# Patient Record
(demographics unavailable — no encounter records)

---

## 2024-11-09 NOTE — CARDIOLOGY SUMMARY
[de-identified] : ECG from 11/14/2024: ECG from 8/29/2023: AF with ventricular rate 111bpm [de-identified] : TTE from 9/23/2024: EF: 63%, mild LA dilation (AASHISH: 25), mild RA dilation, mild MR, aortic valve sclerosis, normal RV size and probably normal RVSF, mild TR, trace PI, no pericardial effusion [de-identified] : [facility] from 12/15/2015 (Dr. Ollie Lyon): normal coronary arteries

## 2024-11-09 NOTE — HISTORY OF PRESENT ILLNESS
[FreeTextEntry1] : Mr. Dhiraj Villa is a 54-year-old man with pre-diabetes and likely long-standing persistent atrial fibrillation. He presents today for an initial evaluation.  To briefly summarize his history, in 2015 he presented to [facility] with palpitations. A coronary angiogram was performed. There was no evidence of obstructive coronary artery disease. In August of 2023 he presented to the Emergency Department at [facility] with fever and body aches. His electrocardiogram demonstrated atrial fibrillation with a ventricular rate of 111 beats per minute. He established care with Dr. Paul Parkinson on September 9th, 2024. His electrocardiogram again demonstrated atrial fibrillation. Eliquis 5mg twice daily was initiated.  He reports symptoms of ____   CHADS2-VASC: 0

## 2024-12-14 NOTE — HISTORY OF PRESENT ILLNESS
[FreeTextEntry1] : Mr. Dhiraj Villa is a 54-year-old man with pre-diabetes and likely long-standing persistent atrial fibrillation. He presents today for an initial evaluation.  To briefly summarize his history, in 2015 he presented to [facility] with palpitations. A coronary angiogram was performed. There was no evidence of obstructive coronary artery disease. In August of 2023 he presented to the Emergency Department at [facility] with fever and body aches. His electrocardiogram demonstrated atrial fibrillation with a ventricular rate of 111 beats per minute. He established care with Dr. Paul Parkinson on September 9th, 2024. His electrocardiogram again demonstrated atrial fibrillation. Eliquis 5mg twice daily was initiated. He discontinued Eliquis in September of 2024 due to reported shortness of breath while taking this medication. Xarelto was initiated on December 11th, 2024.  He reports symptoms of ____  He is scheduled for a sleep study in January of 2025.  CHADS2-VASC: 0

## 2024-12-14 NOTE — CARDIOLOGY SUMMARY
[de-identified] : ECG from 12/20/2024: ECG from 12/11/2024: AF with a ventricular rate of 79bpm ECG from 8/29/2023: AF with ventricular rate 111bpm [de-identified] : TTE from 9/23/2024: EF: 63%, mild LA dilation (AASHISH: 25), mild RA dilation, mild MR, aortic valve sclerosis, normal RV size and probably normal RVSF, mild TR, trace PI, no pericardial effusion [de-identified] : [facility] from 12/15/2015 (Dr. Ollie Lyon): normal coronary arteries

## 2024-12-16 NOTE — CARDIOLOGY SUMMARY
[de-identified] : 8/20/2024 - atrial fibrillation at 80 bpm [de-identified] : 11/28/2023 - septum 0.9 cm, PWT 0.7 cm, mildly dilated LA, mildly dilated RA, hyperdynamic LV systolic function, LVEF 65% [de-identified] : 12/15/2016 with Ollie Ya MD at [facility] - normal coronary arteries

## 2024-12-16 NOTE — HISTORY OF PRESENT ILLNESS
[FreeTextEntry1] : Patient is a 54 year-old Black gentleman with known past medical history of anxiety and atrial fibrillation who presents today to [facility] care.  In December 2015, he presented to [facility] with palpitations that were occurring at night. Cardiac evaluation was negative. He was started on clonazepam for anxiety and panic attacks. Years later, he wanted to wean off benzodiazepine. 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  He is now seen to be in atrial fibrillation that was first noted by his primary care physician in 2023. He was referred to a cardiologist at [facility] for further evaluation. He had an echocardiogram that showed normal LV systolic function (described as hyperdynamic with LVEF 65%), with mildly enlarged atria.

## 2024-12-16 NOTE — HISTORY OF PRESENT ILLNESS
[FreeTextEntry1] : Patient is a 54 year-old Black gentleman with known past medical history of anxiety and atrial fibrillation who presents today to hospitals care.  In December 2015, he presented to [facility] with palpitations that were occurring at night. Cardiac evaluation was negative. He was started on clonazepam for anxiety and panic attacks. Years later, he wanted to wean off benzodiazepine. 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  He is now seen to be in atrial fibrillation that was first noted by his primary care physician in 2023. He was referred to a cardiologist at [facility] for further evaluation. He had an echocardiogram that showed normal LV systolic function (described as hyperdynamic with LVEF 65%), with mildly enlarged atria.

## 2024-12-16 NOTE — DISCUSSION/SUMMARY
[EKG obtained to assist in diagnosis and management of assessed problem(s)] : EKG obtained to assist in diagnosis and management of assessed problem(s) [FreeTextEntry1] : Patient is a 54-year-old Black gentleman with history as above including atrial fibrillation, who presents today for scheduled follow up.   Will refer to EP for evaluation and restoration of sinus rhythm. Will try Xarelto 20 mg daily as he did not feel well on Eliquis previously.  I have stressed the importance of medication consistency toward the goal of stroke risk reduction.  Recommend evaluation of obstructive sleep apnea as possible etiology of his atrial fibrillation. He is now pending sleep study in mid-January.

## 2024-12-16 NOTE — CARDIOLOGY SUMMARY
[de-identified] : 8/20/2024 - atrial fibrillation at 80 bpm [de-identified] : 11/28/2023 - septum 0.9 cm, PWT 0.7 cm, mildly dilated LA, mildly dilated RA, hyperdynamic LV systolic function, LVEF 65% [de-identified] : 12/15/2016 with Ollie Ya MD at [facility] - normal coronary arteries

## 2024-12-16 NOTE — REASON FOR VISIT
[Other: ____] : [unfilled] [FreeTextEntry1] : 12/11/2024 Dhiraj Villa returns today for scheduled follow up.  He self-discontinued Eliquis in September after only a few weeks as he felt short of breath on the medication. Otherwise he is feeling well and today he has no specific complaints. For exercise he jogs around the park and denies any chest discomfort, shortness of breath, palpitations, lightheadedness or syncope.

## 2025-01-03 NOTE — HISTORY OF PRESENT ILLNESS
[FreeTextEntry1] : Mr. Dhiraj Villa is a 54-year-old man with pre-diabetes and likely long-standing persistent atrial fibrillation. He presents today for an initial evaluation.  To briefly summarize his history, in 2015 he presented to [facility] with palpitations. A coronary angiogram was performed. There was no evidence of obstructive coronary artery disease. In August of 2023 he presented to the Emergency Department at [facility] with fever and body aches. His electrocardiogram demonstrated atrial fibrillation with a ventricular rate of 111 beats per minute. He established care with Dr. Paul Parkinson on September 9th, 2024. His electrocardiogram again demonstrated atrial fibrillation. Eliquis 5mg twice daily was initiated. He discontinued Eliquis in September of 2024 due to reported shortness of breath while taking this medication. Xarelto was initiated on December 11th, 2024. He has been compliant with his Xarelto over the last 2 weeks but prior to that has been off anticoagulation.  He reports that he is unaware of his atrial fibrillation. He states that he has some "shortness of breath" which he attributes to the initiation of his anticoagulant. He experienced this symptom while taking both Eliquis and Xarelto. He denies a decline in his exercise tolerance and denies chest pain, near syncope or syncope. He reported palpitations in the past dating back to 2013 when he was diagnosed with panic attacks. he has over recent years been weaning off his anxiolytics and attributed intermittent palpitations to "withdrawal" of these medications.   He is scheduled for a sleep study in January of 2025.  CHADS2-VASC: 0

## 2025-01-03 NOTE — CARDIOLOGY SUMMARY
[de-identified] : ECG from 1/3/2025: Atrial fibrillation with a ventricular rate of 82bpm ECG from 12/11/2024: AF with a ventricular rate of 79bpm ECG from 8/29/2023: AF with ventricular rate 111bpm [de-identified] : TTE from 9/23/2024: EF: 63%, mild LA dilation (AASHISH: 25), mild RA dilation, mild MR, aortic valve sclerosis, normal RV size and probably normal RVSF, mild TR, trace PI, no pericardial effusion [de-identified] : [facility] from 12/15/2015 (Dr. Ollie Lyon): normal coronary arteries

## 2025-01-03 NOTE — HISTORY OF PRESENT ILLNESS
[FreeTextEntry1] : Mr. Dhiraj Villa is a 54-year-old man with pre-diabetes and likely long-standing persistent atrial fibrillation. He presents today for an initial evaluation.  To briefly summarize his history, in 2015 he presented to [facility] with palpitations. A coronary angiogram was performed. There was no evidence of obstructive coronary artery disease. In August of 2023 he presented to the Emergency Department at [facility] with fever and body aches. His electrocardiogram demonstrated atrial fibrillation with a ventricular rate of 111 beats per minute. He established care with Dr. Paul Parkinson on September 9th, 2024. His electrocardiogram again demonstrated atrial fibrillation. Eliquis 5mg twice daily was initiated. He discontinued Eliquis in September of 2024 due to reported shortness of breath while taking this medication. Xarelto was initiated on December 11th, 2024. He has been compliant with his Xarelto over the last 2 weeks but prior to that has been off anticoagulation.  He reports that he is unaware of his atrial fibrillation. He states that he has some "shortness of breath" which he attributes to the initiation of his anticoagulant. He experienced this symptom while taking both Eliquis and Xarelto. He denies a decline in his exercise tolerance and denies chest pain, near syncope or syncope. He reported palpitations in the past dating back to 2013 when he was diagnosed with panic attacks. he has over recent years been weaning off his anxiolytics and attributed intermittent palpitations to "withdrawal" of these medications.   He is scheduled for a sleep study in January of 2025.  CHADS2-VASC: 0

## 2025-01-03 NOTE — PHYSICAL EXAM
[Well Developed] : well developed [Well Nourished] : well nourished [No Acute Distress] : no acute distress [Normal Conjunctiva] : normal conjunctiva [Normal Venous Pressure] : normal venous pressure [Normal S1, S2] : normal S1, S2 [No Murmur] : no murmur [No Rub] : no rub [No Gallop] : no gallop [Clear Lung Fields] : clear lung fields [Good Air Entry] : good air entry [No Respiratory Distress] : no respiratory distress  [Soft] : abdomen soft [Non Tender] : non-tender [Normal Bowel Sounds] : normal bowel sounds [Normal Gait] : normal gait [No Edema] : no edema [No Cyanosis] : no cyanosis [No Rash] : no rash [Moves all extremities] : moves all extremities [No Focal Deficits] : no focal deficits [Normal Speech] : normal speech [Alert and Oriented] : alert and oriented [Normal memory] : normal memory [de-identified] : irregularly irregular

## 2025-01-03 NOTE — END OF VISIT
[FreeTextEntry3] : Patient seen with SHARIFA Wells. Plan for CT then DCCV for long-standing persistent atrial fibrillation. If he feels better once sinus rhythm is restored, I would recommend a catheter ablation vs. a hybrid ablation.  [Time Spent: ___ minutes] : I have spent [unfilled] minutes of time on the encounter which excludes teaching and separately reported services.

## 2025-01-03 NOTE — DISCUSSION/SUMMARY
[EKG obtained to assist in diagnosis and management of assessed problem(s)] : EKG obtained to assist in diagnosis and management of assessed problem(s) [FreeTextEntry1] : In summary, Mr. Villa is a 54-year-old gentleman with a history of anxiety and longstanding persistent atrial fibrillation. We discussed the pathophysiology of atrial fibrillation including management strategies and thromboembolic prophylaxis. The options of a rate control versus a rhythm control strategy were discussed. Although he reports being asymptomatic, we discussed that he may have accommodated to his symptoms. As an initial first step we discussed the role of a cardioversion to assess if he feels better in sinus rhythm. If he feels better in sinus rhythm, we then discussed long-term atrial fibrillation strategies including catheter ablation and hybrid ablation. After all questions were answered, the patient would like to proceed with a cardioversion. He will require a CT scan to assess for a LA thrombus prior to cardioversion as he has not been on an adequate duration of therapeutic anticoagulation. We will schedule his cardioversion days after he obtains his CT scan.   We also stressed importance of compliance of anticoagulation and to take his Xarelto with the largest meal of the day.

## 2025-01-03 NOTE — CARDIOLOGY SUMMARY
[de-identified] : ECG from 1/3/2025: Atrial fibrillation with a ventricular rate of 82bpm ECG from 12/11/2024: AF with a ventricular rate of 79bpm ECG from 8/29/2023: AF with ventricular rate 111bpm [de-identified] : TTE from 9/23/2024: EF: 63%, mild LA dilation (AASHISH: 25), mild RA dilation, mild MR, aortic valve sclerosis, normal RV size and probably normal RVSF, mild TR, trace PI, no pericardial effusion [de-identified] : [facility] from 12/15/2015 (Dr. Ollie Lyon): normal coronary arteries

## 2025-01-03 NOTE — PHYSICAL EXAM
[Well Developed] : well developed [Well Nourished] : well nourished [No Acute Distress] : no acute distress [Normal Conjunctiva] : normal conjunctiva [Normal Venous Pressure] : normal venous pressure [Normal S1, S2] : normal S1, S2 [No Murmur] : no murmur [No Rub] : no rub [No Gallop] : no gallop [Clear Lung Fields] : clear lung fields [Good Air Entry] : good air entry [No Respiratory Distress] : no respiratory distress  [Soft] : abdomen soft [Non Tender] : non-tender [Normal Bowel Sounds] : normal bowel sounds [Normal Gait] : normal gait [No Edema] : no edema [No Cyanosis] : no cyanosis [No Rash] : no rash [Moves all extremities] : moves all extremities [No Focal Deficits] : no focal deficits [Normal Speech] : normal speech [Alert and Oriented] : alert and oriented [Normal memory] : normal memory [de-identified] : irregularly irregular

## 2025-05-22 NOTE — HISTORY OF PRESENT ILLNESS
[FreeTextEntry1] : Mr. Dhiraj Villa is a 54-year-old man with pre-diabetes and likely long-standing persistent atrial fibrillation (status post direct current cardioversions on February 19th, 2025 and March 5th, 2025, presently on Amiodarone). He presents today for a follow-up visit.  To briefly summarize his history, in 2015 he presented to [facility] with palpitations. A coronary angiogram was performed. There was no evidence of obstructive coronary artery disease. In August of 2023 he presented to the Emergency Department at [facility] with fever and body aches. His electrocardiogram demonstrated atrial fibrillation with a ventricular rate of 111 beats per minute. He established care with Dr. Paul Parkinson on September 9th, 2024. His electrocardiogram again demonstrated atrial fibrillation. Eliquis 5mg twice daily was initiated. He discontinued Eliquis in September of 2024 due to reported shortness of breath while taking this medication. Xarelto was initiated on December 11th, 2024. On February 19th, 2025 he underwent a direct current cardioversion. Amiodarone was initiated following the cardioversion. Due to an early recurrence of atrial fibrillation, he underwent a second cardioversion on March 5th, 2025.  Since his last cardioversion, he has been feeling well overall. He noticed an improved mental clarity approximately one week after the second cardioversion. He states this feeling did not persist. He did not notice any differences in how he felt with respect to fatigue or exercise tolerance. He states his heart no longer feels, "off" though. No reports of chest pain, shortness of breath, dizziness, palpitations, lightheadedness, pre-syncope or syncope. He underwent a sleep study in January of 2025. The results are not available for my review.   CHADS2-VASC: 0

## 2025-05-22 NOTE — PHYSICAL EXAM
[Well Developed] : well developed [Well Nourished] : well nourished [No Acute Distress] : no acute distress [Normal Venous Pressure] : normal venous pressure [Normal S1, S2] : normal S1, S2 [No Murmur] : no murmur [No Rub] : no rub [No Gallop] : no gallop [Clear Lung Fields] : clear lung fields [Good Air Entry] : good air entry [No Respiratory Distress] : no respiratory distress  [Soft] : abdomen soft [Non Tender] : non-tender [Normal Gait] : normal gait [No Edema] : no edema [No Cyanosis] : no cyanosis [No Clubbing] : no clubbing [No Varicosities] : no varicosities [No Rash] : no rash [No Skin Lesions] : no skin lesions [Moves all extremities] : moves all extremities [No Focal Deficits] : no focal deficits [Normal Speech] : normal speech [Alert and Oriented] : alert and oriented [Normal memory] : normal memory

## 2025-05-22 NOTE — CARDIOLOGY SUMMARY
[de-identified] : ECG from 5/22/2025: Sinus rhythm at 72bpm ECG from 1/3/2025: Atrial fibrillation with a ventricular rate of 82bpm ECG from 12/11/2024: AF with a ventricular rate of 79bpm ECG from 8/29/2023: AF with ventricular rate 111bpm [de-identified] : TTE from 9/23/2024: EF: 63%, mild LA dilation (AASHISH: 25), mild RA dilation, mild MR, aortic valve sclerosis, normal RV size and probably normal RVSF, mild TR, trace PI, no pericardial effusion [de-identified] : CT [facility] LA from 1/23/2025: No evidence of intracardiac thrombus. [de-identified] : DCCV from 3/5/2025: 360J DCCV for persistent AF  DCCV from 2/19/2025: 2 DCCVs, 200J, then 360J [de-identified] : [facility] from 12/15/2015 (Dr. Ollie Lyon): normal coronary arteries

## 2025-05-22 NOTE — CARDIOLOGY SUMMARY
[de-identified] : ECG from 5/22/2025: Sinus rhythm at 72bpm ECG from 1/3/2025: Atrial fibrillation with a ventricular rate of 82bpm ECG from 12/11/2024: AF with a ventricular rate of 79bpm ECG from 8/29/2023: AF with ventricular rate 111bpm [de-identified] : TTE from 9/23/2024: EF: 63%, mild LA dilation (AASHISH: 25), mild RA dilation, mild MR, aortic valve sclerosis, normal RV size and probably normal RVSF, mild TR, trace PI, no pericardial effusion [de-identified] : CT [facility] LA from 1/23/2025: No evidence of intracardiac thrombus. [de-identified] : DCCV from 3/5/2025: 360J DCCV for persistent AF  DCCV from 2/19/2025: 2 DCCVs, 200J, then 360J [de-identified] : [facility] from 12/15/2015 (Dr. Ollie Lyon): normal coronary arteries

## 2025-05-22 NOTE — HISTORY OF PRESENT ILLNESS
[FreeTextEntry1] : Mr. Dhiraj Villa is a 54-year-old man with pre-diabetes and likely long-standing persistent atrial fibrillation (status post direct current cardioversions on February 19th, 2025 and March 5th, 2025, presently on Amiodarone). He presents today for a follow-up visit.  To briefly summarize his history, in 2015 he presented to [facility] with palpitations. A coronary angiogram was performed. There was no evidence of obstructive coronary artery disease. In August of 2023 he presented to the Emergency Department at [facility] with fever and body aches. His electrocardiogram demonstrated atrial fibrillation with a ventricular rate of 111 beats per minute. He established care with Dr. Paul Parkinson on September 9th, 2024. His electrocardiogram again demonstrated atrial fibrillation. Eliquis 5mg twice daily was initiated. He discontinued Eliquis in September of 2024 due to reported shortness of breath while taking this medication. Xarelto was initiated on December 11th, 2024. On February 19th, 2025 he underwent a direct current cardioversion. Amiodarone was initiated following the cardioversion. Due to an early recurrence of atrial fibrillation, he underwent a second cardioversion on March 5th, 2025.  Since his last cardioversion, he has been feeling well overall. He noticed an improved mental clarity approximately one week after the second cardioversion. He states this feeling did not persist. He did not notice any differences in how he felt with respect to fatigue or exercise tolerance. He states his heart no longer feels, "off" though. No reports of chest pain, shortness of breath, dizziness, palpitations, lightheadedness, pre-syncope or syncope. He underwent a sleep study in January of 2025. The results are not available for my review.   CHADS2-VASC: 0

## 2025-05-22 NOTE — DISCUSSION/SUMMARY
[FreeTextEntry1] : Mr. Dhiraj Villa is a 54-year-old man with pre-diabetes and likely long-standing persistent atrial fibrillation (status post direct current cardioversions on February 19th, 2025 and March 5th, 2025, presently on Amiodarone). He presents today for a follow-up visit.   He reported a minute improvement in his symptoms now that we have restored sinus rhythm. Although there are a multitude of benefits associated with long-term maintenance of sinus rhythm, given that he could barely appreciate the difference between atrial fibrillation and sinus rhythm, I do not want to subject this young man to the long-term side effects of Amiodarone use. I recommended that he stop this medication today. We spoke about his CHADS2-VASC score (zero). He plans to stop Xarelto at this time. If he has a recurrence of atrial fibrillation, and develops new symptoms, then I would recommend a catheter ablation. He will see me again in six months or sooner as needed. I will work on tracking down the results of the sleep study that he reports he performed. All questions were answered to his apparent satisfaction. [EKG obtained to assist in diagnosis and management of assessed problem(s)] : EKG obtained to assist in diagnosis and management of assessed problem(s)